# Patient Record
Sex: FEMALE | Race: WHITE | NOT HISPANIC OR LATINO | URBAN - NONMETROPOLITAN AREA
[De-identification: names, ages, dates, MRNs, and addresses within clinical notes are randomized per-mention and may not be internally consistent; named-entity substitution may affect disease eponyms.]

---

## 2020-11-12 ENCOUNTER — APPOINTMENT (RX ONLY)
Dept: URBAN - NONMETROPOLITAN AREA CLINIC 1 | Facility: CLINIC | Age: 55
Setting detail: DERMATOLOGY
End: 2020-11-12

## 2020-11-12 DIAGNOSIS — L30.9 DERMATITIS, UNSPECIFIED: ICD-10-CM

## 2020-11-12 DIAGNOSIS — D69.2 OTHER NONTHROMBOCYTOPENIC PURPURA: ICD-10-CM

## 2020-11-12 DIAGNOSIS — D22 MELANOCYTIC NEVI: ICD-10-CM

## 2020-11-12 PROBLEM — D22.5 MELANOCYTIC NEVI OF TRUNK: Status: ACTIVE | Noted: 2020-11-12

## 2020-11-12 PROCEDURE — 99203 OFFICE O/P NEW LOW 30 MIN: CPT

## 2020-11-12 PROCEDURE — ? PRESCRIPTION

## 2020-11-12 PROCEDURE — ? PHOTO-DOCUMENTATION

## 2020-11-12 PROCEDURE — ? ADDITIONAL NOTES

## 2020-11-12 PROCEDURE — ? FULL BODY SKIN EXAM - DECLINED

## 2020-11-12 PROCEDURE — ? COUNSELING

## 2020-11-12 PROCEDURE — ? REFERRAL CORRESPONDENCE

## 2020-11-12 RX ORDER — TRIAMCINOLONE ACETONIDE 1 MG/G
OINTMENT TOPICAL
Qty: 1 | Refills: 1 | Status: ERX | COMMUNITY
Start: 2020-11-12

## 2020-11-12 RX ADMIN — TRIAMCINOLONE ACETONIDE: 1 OINTMENT TOPICAL at 00:00

## 2020-11-12 ASSESSMENT — LOCATION DETAILED DESCRIPTION DERM
LOCATION DETAILED: LEFT DISTAL DORSAL FOREARM
LOCATION DETAILED: INFERIOR THORACIC SPINE
LOCATION DETAILED: LEFT BUTTOCK
LOCATION DETAILED: RIGHT DISTAL DORSAL FOREARM
LOCATION DETAILED: RIGHT INFERIOR MEDIAL MIDBACK

## 2020-11-12 ASSESSMENT — LOCATION SIMPLE DESCRIPTION DERM
LOCATION SIMPLE: LEFT FOREARM
LOCATION SIMPLE: UPPER BACK
LOCATION SIMPLE: RIGHT LOWER BACK
LOCATION SIMPLE: RIGHT FOREARM
LOCATION SIMPLE: LEFT BUTTOCK

## 2020-11-12 ASSESSMENT — LOCATION ZONE DERM
LOCATION ZONE: TRUNK
LOCATION ZONE: ARM

## 2020-11-12 NOTE — HPI: ITCHING
What Type Of Note Output Would You Prefer (Optional)?: Bullet Format
How Did Your Itching Occur?: sudden in onset (over a period of weeks to a few months)
How Severe Is Your Itching?: moderate
Additional History: Fluconazole, lotrisone,

## 2020-11-12 NOTE — PROCEDURE: ADDITIONAL NOTES
Additional Notes: Add antihistamine BID like Zyrtec, continue Hydroxyzine and Benadryl , change soap and laundry detergent, add Sarna,cool showers and compresses,

## 2020-11-12 NOTE — PROCEDURE: ADDITIONAL NOTES
Additional Notes: 40mg Kenolog injection, [id number] exp 7/2020 Additional Notes: 40mg Kenolog injection, [id number] exp 7/2020

## 2022-01-06 ENCOUNTER — HOSPITAL ENCOUNTER (OUTPATIENT)
Dept: PHYSICAL THERAPY | Age: 57
End: 2022-01-06

## 2022-01-06 ENCOUNTER — HOSPITAL ENCOUNTER (OUTPATIENT)
Dept: PHYSICAL THERAPY | Age: 57
Discharge: HOME | End: 2022-01-06
Payer: MEDICARE

## 2022-01-06 PROCEDURE — 97165 OT EVAL LOW COMPLEX 30 MIN: CPT

## 2022-01-06 PROCEDURE — 97760 ORTHOTIC MGMT&TRAING 1ST ENC: CPT

## 2022-01-06 NOTE — PROGRESS NOTES
Taylor Tavera  : 1965  [place]  [place] [facility] [place] [address] [facility], [place] [address].  [phone]   [phone]       OUTPATIENT OCCUPATIONAL THERAPY: Daily Treatment Note 2022  Visit Count:  1    ICD-10: Treatment Diagnosis: Pain in left hand (A13.193)  Precautions/Allergies:   Patient has no allergy information on record. Patient reports sensitivity to adhesive tapes. TREATMENT PLAN (today's visit only pending subsequent therapy approval per MD):  Effective Dates: 2022 TO 2022 (90 days). Frequency/Duration: 2 times a week for 90 Day(s)    Pre-treatment Symptoms/Complaints:  Patient reports the doctor recommended therapy following receiving her orthosis and encouraged finger ROM. Pain: Initial: Pain Intensity 1:  (elevated - did not rate)  Post Session:  same/10   Medications Last Reviewed:  2022   Updated Objective Findings:  See evaluation note from today   TREATMENT:     Orthotic Management/Training: (40 minutes): This hand - left, thumb - left and wrist - left orthotic is being utilized in order to increase patient's functional independence. Patient/caregiver educated to proper application and appropriate use of this orthotic and the patient benefits from this orthotic by achieving increased joint stability and achieving a lower level of pain during tasks. A thumb spica orthosis was fabricated using a thermoplastic material.  Using sterile technique guaze then stockinette was applied to L dorsal thumb pins to protect pins to allow orthosis fabrication and to prevent infection. After the pattern was made thermoplastic material was heated then cut to include the L thumb MP joint (distal to), CMC, and wrist applied to radial forearm ~ 1/3rd to 1/2 forearm length.   Material was wrapped starting proximal to R thumb IP joint volarly to dorsally wrapped then radially to ~1-2nd metacarpal dorsally and around thenar eminence volarly being careful to maintain thumb in present position (~30 garrison/radial abduction). 2 6\" x 1/2\" hook was cut, rounded then attached to the orthosis after adhesive hooks applied to dorsal/volar surface at proximal edge and at the level of the proximal wrist crease. 1 5\" x 1/4\" hook was cut, rounded and applied to the distal portion of the orthosis. Patient donned/doffed independently and verbalized understanding of wearing schedule (all the time except when showering as instructed by MD). [employer] Portal  Treatment/Session Summary:    · Response to Treatment:  tolerated well without complications. · Communication/Consultation:  Evaluation sent to MD  · Equipment provided today:  L custom thumb spica orthosis and additional guaze  · Recommendations/Intent for next treatment session: Next visit will focus on advancement to more challenging activities.     Total Treatment Billable Duration:  40 minutes +untimed assessment  OT Patient Time In/Time Out  Time In: 1210  Time Out: 1300  CAROLINA Polo/L    Future Appointments   Date Time Provider Zachariah Shields   1/25/2022 10:15 AM NAZIA Almazan, OTR/L [facility]
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
## 2022-03-17 NOTE — THERAPY DISCHARGE
Bren Souza  : 1965  [place]  [place] [facility] [place] [address], [place] [address].  [phone]   [phone]          OUTPATIENT OCCUPATIONAL THERAPY:Discontinuation Summary 2022   ICD-10: Treatment Diagnosis: Pain in left hand (G06.323)  Precautions/Allergies:   Patient has no allergy information on record. Patient reports sensitivity to adhesive tapes. TREATMENT PLAN (today's visit only pending subsequent therapy approval per MD):  Effective Dates: 2022 TO 2022 (90 days). Frequency/Duration: 2 times a week for 90 Day(s) MEDICAL/REFERRING DIAGNOSIS:  Other specified postprocedural states [Z98.890]   DATE OF ONSET:   REFERRING PHYSICIAN: Sherice oCsta MD Orders: L thumb spica orthosis  Return MD Appointment: ~22   DISCONTINUATION SUMMARY:  Bren Souza has been seen in occupational therapy from 22 to 22 for 1 visit. Treatment has been discontinued at this time due to patient failing to return for additional treatment. The below goals were met prior to discontinuation. Some goals were not met due to inability to re-assess. Thank you for this referral.          GOALS: (Goals have been discussed and agreed upon with patient.)  Short-Term Functional Goals: Time Frame: 4 weeks  1. Decrease pain to mild or less to allow patient to perform self care tasks. Not re-assessed  2. Increase motion in L fingers to distal garrison crease to improve functional use of upper extremity in ADL activities. Not re-assessed  3. Don/doff custom L thumb spica hand wrist orthosis independently as per MD orders. Met. Discharge Goals: Time Frame: 12 weeks Not re-assessed  1. Decrease pain to none to allow patient to perform all household and work tasks.   2. Increase motion in L thumb to oppose all fingers (when cleared by MD) to allow patient to perform all ADL activities. 3. Increase strength in L  by 25 pounds  (when cleared by MD) to allow patient to , lift, hold, and carry heavy objects. OUTCOME MEASURE:   Tool Used: Disabilities of the Arm, Shoulder and Hand (DASH) Questionnaire - Quick Version  Score:  Initial: to be scored at a later time/55  Most Recent: X/55 (Date: -- )   Interpretation of Score: The DASH is designed to measure the activities of daily living in person's with upper extremity dysfunction or pain. Each section is scored on a 1-5 scale, 5 representing the greatest disability. The scores of each section are added together for a total score of 55.       Thank you for this referral,  Conception Diss, OTD, OTR/L